# Patient Record
Sex: MALE | URBAN - METROPOLITAN AREA
[De-identification: names, ages, dates, MRNs, and addresses within clinical notes are randomized per-mention and may not be internally consistent; named-entity substitution may affect disease eponyms.]

---

## 2022-02-03 ENCOUNTER — OFFICE VISIT (OUTPATIENT)
Dept: URBAN - METROPOLITAN AREA CLINIC 13 | Facility: CLINIC | Age: 82
End: 2022-02-03
Payer: MEDICARE

## 2022-02-03 DIAGNOSIS — H43.812 VITREOUS DETACHMENT OF LEFT EYE: ICD-10-CM

## 2022-02-03 DIAGNOSIS — H54.40 BLINDNESS IN ONE EYE: ICD-10-CM

## 2022-02-03 DIAGNOSIS — H25.13 AGE-RELATED NUCLEAR CATARACT, BILATERAL: ICD-10-CM

## 2022-02-03 DIAGNOSIS — H20.9 UVEITIS: Primary | ICD-10-CM

## 2022-02-03 PROCEDURE — 99204 OFFICE O/P NEW MOD 45 MIN: CPT | Performed by: OPHTHALMOLOGY

## 2022-02-03 PROCEDURE — 92134 CPTRZ OPH DX IMG PST SGM RTA: CPT | Performed by: OPHTHALMOLOGY

## 2022-02-03 ASSESSMENT — INTRAOCULAR PRESSURE
OS: 10
OD: 11

## 2022-02-03 NOTE — IMPRESSION/PLAN
Impression: Vitreous detachment of left eye: H43.812. Left. Plan: PVD with no retinal break or retinal detachment OS. Reviewed signs and symptoms of a retinal tear and detachment. Advised to return immediately for new floaters, flashing lights or a shadow in the vision.

## 2022-02-03 NOTE — IMPRESSION/PLAN
Impression: Age-related nuclear cataract, bilateral: H25.13. Bilateral. Plan: Exam demonstrates cataracts show progression in both eyes OD>OS. Patient is cleared from a retina standpoint to proceed with surgery.

## 2022-02-03 NOTE — IMPRESSION/PLAN
Impression: Uveitis: H20.9. Left. 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 Started Prednisolone TID 1/24/21. Patient states vision is improving. There are mild-moderate vitreous opacities OS. OCT reveals no CME OS. Discussed treatment options (continue Prednisolone, STS, Ozurdex). Since patient is noting improvement with Prednisolone and he is monocular, recommend continuing Prednisolone TID OS. 

Advised patient to follow up with Dr. Mendy Montelongo and to return to [facility]